# Patient Record
Sex: MALE | Race: WHITE
[De-identification: names, ages, dates, MRNs, and addresses within clinical notes are randomized per-mention and may not be internally consistent; named-entity substitution may affect disease eponyms.]

---

## 2018-01-26 ENCOUNTER — HOSPITAL ENCOUNTER (EMERGENCY)
Dept: HOSPITAL 17 - PHED | Age: 68
Discharge: HOME | End: 2018-01-26
Payer: MEDICARE

## 2018-01-26 VITALS
SYSTOLIC BLOOD PRESSURE: 111 MMHG | DIASTOLIC BLOOD PRESSURE: 61 MMHG | TEMPERATURE: 98.5 F | HEART RATE: 88 BPM | OXYGEN SATURATION: 99 % | RESPIRATION RATE: 18 BRPM

## 2018-01-26 VITALS — BODY MASS INDEX: 26.76 KG/M2 | HEIGHT: 73 IN | WEIGHT: 201.94 LBS

## 2018-01-26 VITALS
RESPIRATION RATE: 18 BRPM | SYSTOLIC BLOOD PRESSURE: 133 MMHG | DIASTOLIC BLOOD PRESSURE: 73 MMHG | HEART RATE: 82 BPM | OXYGEN SATURATION: 98 %

## 2018-01-26 DIAGNOSIS — F17.210: ICD-10-CM

## 2018-01-26 DIAGNOSIS — Z88.5: ICD-10-CM

## 2018-01-26 DIAGNOSIS — E11.9: ICD-10-CM

## 2018-01-26 DIAGNOSIS — R10.30: Primary | ICD-10-CM

## 2018-01-26 DIAGNOSIS — I10: ICD-10-CM

## 2018-01-26 DIAGNOSIS — Z79.84: ICD-10-CM

## 2018-01-26 DIAGNOSIS — R16.0: ICD-10-CM

## 2018-01-26 DIAGNOSIS — Z79.899: ICD-10-CM

## 2018-01-26 DIAGNOSIS — K80.20: ICD-10-CM

## 2018-01-26 LAB
ALBUMIN SERPL-MCNC: 3.4 GM/DL (ref 3.4–5)
ALP SERPL-CCNC: 126 U/L (ref 45–117)
ALT SERPL-CCNC: 68 U/L (ref 12–78)
AST SERPL-CCNC: 57 U/L (ref 15–37)
BASOPHILS # BLD AUTO: 0 TH/MM3 (ref 0–0.2)
BASOPHILS NFR BLD: 0.6 % (ref 0–2)
BILIRUB SERPL-MCNC: 0.8 MG/DL (ref 0.2–1)
BUN SERPL-MCNC: 23 MG/DL (ref 7–18)
CALCIUM SERPL-MCNC: 9.2 MG/DL (ref 8.5–10.1)
CHLORIDE SERPL-SCNC: 105 MEQ/L (ref 98–107)
COLOR UR: YELLOW
CREAT SERPL-MCNC: 1.5 MG/DL (ref 0.6–1.3)
EOSINOPHIL # BLD: 0.2 TH/MM3 (ref 0–0.4)
EOSINOPHIL NFR BLD: 2.9 % (ref 0–4)
ERYTHROCYTE [DISTWIDTH] IN BLOOD BY AUTOMATED COUNT: 14.6 % (ref 11.6–17.2)
GFR SERPLBLD BASED ON 1.73 SQ M-ARVRAT: 47 ML/MIN (ref 89–?)
GLUCOSE SERPL-MCNC: 147 MG/DL (ref 74–106)
GLUCOSE UR STRIP-MCNC: (no result) MG/DL
HCO3 BLD-SCNC: 23.5 MEQ/L (ref 21–32)
HCT VFR BLD CALC: 34 % (ref 39–51)
HGB BLD-MCNC: 11 GM/DL (ref 13–17)
HGB UR QL STRIP: (no result)
INR PPP: 1.1 RATIO
KETONES UR STRIP-MCNC: (no result) MG/DL
LIPASE: 189 U/L (ref 73–393)
LYMPHOCYTES # BLD AUTO: 1.1 TH/MM3 (ref 1–4.8)
LYMPHOCYTES NFR BLD AUTO: 16.9 % (ref 9–44)
MCH RBC QN AUTO: 28.9 PG (ref 27–34)
MCHC RBC AUTO-ENTMCNC: 32.3 % (ref 32–36)
MCV RBC AUTO: 89.5 FL (ref 80–100)
MONOCYTE #: 0.7 TH/MM3 (ref 0–0.9)
MONOCYTES NFR BLD: 10.8 % (ref 0–8)
NEUTROPHILS # BLD AUTO: 4.4 TH/MM3 (ref 1.8–7.7)
NEUTROPHILS NFR BLD AUTO: 68.8 % (ref 16–70)
NITRITE UR QL STRIP: (no result)
PLATELET # BLD: 136 TH/MM3 (ref 150–450)
PMV BLD AUTO: 7.2 FL (ref 7–11)
PROT SERPL-MCNC: 9.2 GM/DL (ref 6.4–8.2)
PROTHROMBIN TIME: 11.3 SEC (ref 9.8–11.6)
RBC # BLD AUTO: 3.8 MIL/MM3 (ref 4.5–5.9)
RBC #/AREA URNS HPF: (no result) /HPF (ref 0–3)
SODIUM SERPL-SCNC: 138 MEQ/L (ref 136–145)
SP GR UR STRIP: 1.01 (ref 1–1.03)
SQUAMOUS #/AREA URNS HPF: (no result) /HPF (ref 0–5)
URINE LEUKOCYTE ESTERASE: (no result)
WBC # BLD AUTO: 6.4 TH/MM3 (ref 4–11)

## 2018-01-26 PROCEDURE — 81001 URINALYSIS AUTO W/SCOPE: CPT

## 2018-01-26 PROCEDURE — 85610 PROTHROMBIN TIME: CPT

## 2018-01-26 PROCEDURE — 85730 THROMBOPLASTIN TIME PARTIAL: CPT

## 2018-01-26 PROCEDURE — 96375 TX/PRO/DX INJ NEW DRUG ADDON: CPT

## 2018-01-26 PROCEDURE — 99284 EMERGENCY DEPT VISIT MOD MDM: CPT

## 2018-01-26 PROCEDURE — 80053 COMPREHEN METABOLIC PANEL: CPT

## 2018-01-26 PROCEDURE — 96374 THER/PROPH/DIAG INJ IV PUSH: CPT

## 2018-01-26 PROCEDURE — 74177 CT ABD & PELVIS W/CONTRAST: CPT

## 2018-01-26 PROCEDURE — 85025 COMPLETE CBC W/AUTO DIFF WBC: CPT

## 2018-01-26 PROCEDURE — 83690 ASSAY OF LIPASE: CPT

## 2018-01-26 NOTE — PD
HPI


Chief Complaint:  Abdominal Pain


Time Seen by Provider:  13:36


Travel History


International Travel<30 days:  No


Contact w/Intl Traveler<30days:  No


Traveled to known affect area:  No





History of Present Illness


HPI


This patient complains of abdominal pain.  Duration is 2 months.  It's a 

constant daily pain.  Location is bilateral lower quadrants.  Symptoms have no 

alleviating factors.  There are no exacerbating factors.  He denies diarrhea or 

vomiting or fever.





PFSH


Past Medical History


Diabetes:  Yes


Patient Takes Glucophage:  Yes


Hypertension:  Yes


Tetanus Vaccination:  > 5 Years


Influenza Vaccination:  No





Social History


Alcohol Use:  No


Tobacco Use:  Yes (1/2 PPD)


Substance Use:  No





Allergies-Medications


(Allergen,Severity, Reaction):  


Coded Allergies:  


     codeine (Verified  Allergy, Severe, VOMITING, 1/26/18)


Reported Meds & Prescriptions





Reported Meds & Active Scripts


Active


Tramadol (Tramadol HCl) 50 Mg Tab 50 Mg PO Q6H PRN


Reported


Amlodipine (Amlodipine Besylate) 2.5 Mg Tab 2.5 Mg PO DAILY


Losartan (Losartan Potassium) 25 Mg Tab 12.5 Mg PO DAILY


Metformin ER (Metformin HCl) 1,000 Mg Jonathan 1,000 Mg PO BID


     With evening meal








Review of Systems


General / Constitutional:  No: Fever


Eyes:  No: Visual changes


HENT:  No: Headaches


Cardiovascular:  No: Chest Pain or Discomfort


Respiratory:  No: Shortness of Breath


Gastrointestinal:  Positive: Abdominal Pain


Genitourinary:  No: Dysuria


Musculoskeletal:  No: Pain


Skin:  No Rash


Neurologic:  No: Weakness


Psychiatric:  No: Depression


Endocrine:  No: Polydipsia


Hematologic/Lymphatic:  No: Easy Bruising





Physical Exam


Narrative


GENERAL: Well-nourished, well-developed patient in no apparent distress.


SKIN: Focused skin assessment reveals no rash and nodules. Skin is Warm and dry.


HEAD: Atraumatic. Normocephalic. 


EYES: Pupils equal and round. No scleral icterus. No injection or drainage. 


ENT: No nasal bleeding or discharge.  Mucous membranes pink and moist.


NECK: Trachea midline. No JVD. 


CARDIOVASCULAR: Regular rate and rhythm.  No murmur appreciated.


RESPIRATORY: No accessory muscle use. Clear to auscultation. Breath sounds 

equal bilaterally. 


GASTROINTESTINAL: Abdomen soft, non-tender, nondistended. Hepatic and splenic 

margins not palpable. 


MUSCULOSKELETAL: No obvious deformities. No clubbing.  No cyanosis.  No edema. 


NEUROLOGICAL: Awake and alert. No obvious cranial nerve deficits.  Motor 

grossly within normal limits. Normal speech.


PSYCHIATRIC: Appropriate mood and affect; insight and judgment normal.





Data


Data


Last Documented VS





Vital Signs








  Date Time  Temp Pulse Resp B/P (MAP) Pulse Ox O2 Delivery O2 Flow Rate FiO2


 


1/26/18 14:10  82 18 133/73 (93) 98 Room Air  


 


1/26/18 11:52 98.5       








Orders





 Orders


Complete Blood Count With Diff (1/26/18 13:43)


Comprehensive Metabolic Panel (1/26/18 13:43)


Lipase (1/26/18 13:43)


Prothrombin Time / Inr (Pt) (1/26/18 13:43)


Act Partial Throm Time (Ptt) (1/26/18 13:43)


Urinalysis - C+S If Indicated (1/26/18 13:43)


Ct Abd/Pel W Iv Contrast(Rout) (1/26/18 13:43)


Iv Access Insert/Monitor (1/26/18 13:43)


NPO (1/26/18 13:43)


Morphine Inj (Morphine Inj) (1/26/18 13:45)


Ondansetron Inj (Zofran Inj) (1/26/18 13:45)


Sodium Chloride 0.9% Flush (Ns Flush) (1/26/18 13:45)


Iohexol 350 Inj (Omnipaque 350 Inj) (1/26/18 14:44)





Labs





Laboratory Tests








Test


  1/26/18


14:00 1/26/18


14:15


 


White Blood Count 6.4 TH/MM3  


 


Red Blood Count 3.80 MIL/MM3  


 


Hemoglobin 11.0 GM/DL  


 


Hematocrit 34.0 %  


 


Mean Corpuscular Volume 89.5 FL  


 


Mean Corpuscular Hemoglobin 28.9 PG  


 


Mean Corpuscular Hemoglobin


Concent 32.3 % 


  


 


 


Red Cell Distribution Width 14.6 %  


 


Platelet Count 136 TH/MM3  


 


Mean Platelet Volume 7.2 FL  


 


Neutrophils (%) (Auto) 68.8 %  


 


Lymphocytes (%) (Auto) 16.9 %  


 


Monocytes (%) (Auto) 10.8 %  


 


Eosinophils (%) (Auto) 2.9 %  


 


Basophils (%) (Auto) 0.6 %  


 


Neutrophils # (Auto) 4.4 TH/MM3  


 


Lymphocytes # (Auto) 1.1 TH/MM3  


 


Monocytes # (Auto) 0.7 TH/MM3  


 


Eosinophils # (Auto) 0.2 TH/MM3  


 


Basophils # (Auto) 0.0 TH/MM3  


 


CBC Comment DIFF FINAL  


 


Differential Comment   


 


Prothrombin Time 11.3 SEC  


 


Prothromb Time International


Ratio 1.1 RATIO 


  


 


 


Activated Partial


Thromboplast Time 25.0 SEC 


  


 


 


Blood Urea Nitrogen 23 MG/DL  


 


Creatinine 1.50 MG/DL  


 


Random Glucose 147 MG/DL  


 


Total Protein 9.2 GM/DL  


 


Albumin 3.4 GM/DL  


 


Calcium Level 9.2 MG/DL  


 


Alkaline Phosphatase 126 U/L  


 


Aspartate Amino Transf


(AST/SGOT) 57 U/L 


  


 


 


Alanine Aminotransferase


(ALT/SGPT) 68 U/L 


  


 


 


Total Bilirubin 0.8 MG/DL  


 


Sodium Level 138 MEQ/L  


 


Potassium Level 3.9 MEQ/L  


 


Chloride Level 105 MEQ/L  


 


Carbon Dioxide Level 23.5 MEQ/L  


 


Anion Gap 10 MEQ/L  


 


Estimat Glomerular Filtration


Rate 47 ML/MIN 


  


 


 


Lipase 189 U/L  


 


Urine Collection Type  CLEAN CATCH 


 


Urine Color  YELLOW 


 


Urine Turbidity  CLEAR 


 


Urine pH  5.5 


 


Urine Specific Gravity  1.015 


 


Urine Protein  NEG mg/dL 


 


Urine Glucose (UA)  NEG mg/dL 


 


Urine Ketones  NEG mg/dL 


 


Urine Occult Blood  NEG 


 


Urine Nitrite  NEG 


 


Urine Bilirubin  NEG 


 


Urine Leukocyte Esterase  NEG 


 


Urine RBC  0-3 /hpf 


 


Urine Squamous Epithelial


Cells 


  0-5 /hpf 


 


 


Microscopic Urinalysis Comment


  


  CULT NOT


INDICATED


 


Urine Collection Time  14:15 











MDM


Medical Decision Making


Medical Screen Exam Complete:  Yes


Emergency Medical Condition:  Yes


Medical Record Reviewed:  Yes


Differential Diagnosis


Aneurysm, colitis, ileus


Narrative Course


I have reviewed the patient's electronic medical record.





IV placed


CBC is normal


metabolic profile is normal


LFT's are normal


lipase is normal


Urinalysis is clean


CT of abdomen and pelvis shows findings of a 4 cm mass in the liver.  

Concerning for tumor.  I reviewed in detail with the patient.  He is to discuss 

with his physician and get follow-up imaging or further diagnostics.  This does 

not need to be done emergently today. 


Gave him a dose of morphine and Zofran for symptom relief





It's not clear that this liver lesion is the cause of his pain.  He has no pain 

in the right upper quadrant.  It could be an incidental finding.


Wrote him some tramadol.


Stable for outpatient follow-up





Diagnosis





 Primary Impression:  


 Chronic bilateral lower abdominal pain


 Additional Impression:  


 Liver mass





***Additional Instructions:  


The patient was advised to follow up with their physician and return if they 

worsen.


The patient was warned about potential sedation for the medications they will 

receive on prescription.


***Med/Other Pt SpecificInfo:  Prescription(s) given


Scripts


Tramadol (Tramadol) 50 Mg Tab


50 MG PO Q6H Y for PAIN, #20 TAB 0 Refills


   Prov: Jose Arita MD         1/26/18


Disposition:  01 DISCHARGE HOME


Condition:  Stable











Jose Arita MD Jan 26, 2018 13:52

## 2018-01-26 NOTE — RADRPT
EXAM DATE/TIME:  01/26/2018 14:37 

 

[facility] COMPARISON:     

No previous studies available for comparison.

 

 

INDICATIONS :     

Lower abdominal pain x 1 month. 

                      

 

IV CONTRAST:     

80 cc Omnipaque 350 (iohexol) IV 

 

 

ORAL CONTRAST:      

No oral contrast ingested.

                      

 

RADIATION DOSE:     

16.59 CTDIvol (mGy) 

 

 

MEDICAL HISTORY :     

Diabetes mellitus type 2. Hypertension. 

 

SURGICAL HISTORY :      

None. 

 

ENCOUNTER:      

Initial

 

ACUITY:      

1 month

 

PAIN SCALE:      

10/10

 

LOCATION:       

Bilateral lower quadrant 

 

TECHNIQUE:     

Volumetric scanning of the abdomen and pelvis was performed.  Using automated exposure control and ad
justment of the mA and/or kV according to patient size, radiation dose was kept as low as reasonably 
achievable to obtain optimal diagnostic quality images.  DICOM format image data is available electro
nically for review and comparison.  

 

FINDINGS:     

 

LOWER LUNGS:     

The visualized lower lungs are clear.

 

LIVER:     

There is an area of abnormal density involving segment 3 of the liver worrisome for a mass. This manda
ures approximately 4 cm in diameter. It does generate a contour irregularity involving the anterior c
apsule. The remaining liver is unremarkable. No ductal dilatation. Portal vein is patent. A solitary 
1 cm calcified gallstone is noted within the gallbladder. The gallbladder is not distended. No gallbl
adder wall thickening.

 

SPLEEN:     

Splenomegaly. The spleen measures 17.3 cm in greatest dimension. No discrete mass. Splenic vein is pa
tent.

 

PANCREAS:     

Within normal limits.

 

KIDNEYS:     

Normal in size and shape.  There is no mass, stone or hydronephrosis. Small cortical renal cysts bila
terally.

 

ADRENAL GLANDS:     

Within normal limits.

 

VASCULAR:     

There is no aortic aneurysm.

 

BOWEL/MESENTERY:     

Varicosities are seen at the GE junction. The stomach, small bowel, and colon demonstrate no acute ab
normality.  There is no free intraperitoneal air or fluid.

 

ABDOMINAL WALL:     

Within normal limits.

 

RETROPERITONEUM:     

There is no lymphadenopathy. 

 

BLADDER:     

No wall thickening or mass. 

 

REPRODUCTIVE:     

Within normal limits.

 

INGUINAL:     

There is no lymphadenopathy or hernia. 

 

MUSCULOSKELETAL:     

Within normal limits for patient age. 

 

CONCLUSION:     

1. Concern for a mass involving segment 3 of the liver. MRI with gadolinium is needed to further eval
uate.

2. Splenomegaly.

3. Cholelithiasis.

4. GE junction varices suggesting portal hypertension.

 

 

 

 Zachary Elias Jr., MD on January 26, 2018 at 14:53           

Board Certified Radiologist.

 This report was verified electronically.

## 2018-03-27 ENCOUNTER — HOSPITAL ENCOUNTER (OUTPATIENT)
Dept: HOSPITAL 17 - HRAD | Age: 68
Discharge: HOME | End: 2018-03-27
Attending: INTERNAL MEDICINE
Payer: MEDICARE

## 2018-03-27 DIAGNOSIS — K76.6: ICD-10-CM

## 2018-03-27 DIAGNOSIS — K74.60: ICD-10-CM

## 2018-03-27 DIAGNOSIS — E11.9: ICD-10-CM

## 2018-03-27 DIAGNOSIS — C22.0: ICD-10-CM

## 2018-03-27 DIAGNOSIS — R18.8: Primary | ICD-10-CM

## 2018-03-27 PROCEDURE — 76705 ECHO EXAM OF ABDOMEN: CPT

## 2018-03-27 NOTE — RADRPT
EXAM DATE/TIME:  03/27/2018 09:57 

 

[facility] COMPARISON:     

No previous studies available for comparison.

        

 

 

INDICATIONS :     

Evaluate for ascites.

                     

 

MEDICAL HISTORY :           

Diabetes. HTN. Liver cancer. Portal hypertension. 

 

SURGICAL HISTORY :          

None.

 

ENCOUNTER:     

Subsequent

 

ACUITY:     

1 day

 

PAIN SCORE:     

5/10

 

LOCATION:         

Four quadrant abdomen.

AREA EVALUATED:       

Four quadrant.

 

FINDINGS:     

Imaging of the abdomen and pelvis was performed to evaluate for ascites for possible paracentesis.  

 

No significant free fluid was identified in the abdomen.

 

CONCLUSION:     

No significant ascites.

 

 

 

 Brendon Bright MD on March 27, 2018 at 10:19           

Board Certified Radiologist.

 This report was verified electronically.

## 2018-04-18 ENCOUNTER — HOSPITAL ENCOUNTER (OUTPATIENT)
Dept: HOSPITAL 17 - HROP | Age: 68
Discharge: HOME | End: 2018-04-18
Attending: INTERNAL MEDICINE
Payer: MEDICARE

## 2018-04-18 VITALS
SYSTOLIC BLOOD PRESSURE: 128 MMHG | DIASTOLIC BLOOD PRESSURE: 56 MMHG | HEART RATE: 83 BPM | RESPIRATION RATE: 17 BRPM | OXYGEN SATURATION: 100 %

## 2018-04-18 DIAGNOSIS — E11.9: ICD-10-CM

## 2018-04-18 DIAGNOSIS — K74.60: ICD-10-CM

## 2018-04-18 DIAGNOSIS — I10: ICD-10-CM

## 2018-04-18 DIAGNOSIS — B19.20: ICD-10-CM

## 2018-04-18 DIAGNOSIS — C22.0: Primary | ICD-10-CM

## 2018-04-19 NOTE — RADRPT
EXAM DATE/TIME:  04/18/2018 00:00 

 

[facility] COMPARISON :  

No previous studies available for comparison.

 

INDICATIONS :      

consult Y-90 embolization

                              

 

OBJECTIVE:      

 

Temperature:     

98.6

 

Heart Rate:     

83

 

Blood Pressure:     

128/56

 

Respiratory:     

17

 

Oximetry:     

100

                              

 

PNEUMONIA VACCINE:            

                              

 

HISTORY OF PRESENT ILLNESS: 

67-year-old male with history of hepatitis C. cirrhosis and recently diagnosed infiltrative hepatocel
lular carcinoma. Patient has moderate lower abdominal pain which may be related to subcapsular lesion
s with significant capsular deformity in segment 2. He is otherwise without complaints. ECOG 1. most 
recent lab results dated 1/26 demonstrate essentially normal LFTs. Most recent PET/CT demonstrates sm
all metastatic foci at L5 and S1. 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

 

PAST MEDICAL HISTORY :  

1.       Hypertension.

2.       Diabetes mellitus 2.

3.       hepatocellular carcinoma

 

PAST SURGICAL HISTORY :

      None.

      

 

SOCIAL HISTORY : 

No alcohol use.

Tobacco;none.

 

 

 

1.     losartan/hctz 100-12.5 mg q.d.

2.     amlodipine 10 mg q.d.

3.     metformin ER 1000 mg b.i.d.

4.     omeprazole DR 20 mg q.d.

5.     oxycodone 10 mg prn

         

  

 

PHYSICAL EXAMINATION: 

 

General: 

No acute distress

 

Abdomen: 

Moderate tenderness on epigastric palpation

 

 

IMAGING STUDIES:  

Her MRI exam dated 2/14/2018 and PET/CT exam dated 4/5/2018 were reviewed. These demonstrate signific
ant infiltrative mass primarily in the left lobe liver with probable portal vein involvement and smal
l focal metastases at L5 and S1. Otherwise, no significant extrahepatic disease.

 

ASSESSMENT:

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 He i
s very motivated to pursue treatment and is obviously not a surgical candidate. Extensive discussion 
regarding locoregional treatment options including chemoembolization and yttrium-90. Given the extent
 of disease and portal vein involvement, yttrium-90 is the best option as it is less embolic and bett
er tolerated.

 

Extensive discussion regarding risks and benefits. All questions were answered.

 

PLAN: 

Ytrium-90 embolization

 

TIME SPENT:

30 minutes

 

 

 

 

 Sam Del Cid MD on April 19, 2018 at 7:55           

Board Certified Radiologist.

 This report was verified electronically.

## 2018-05-17 ENCOUNTER — HOSPITAL ENCOUNTER (OUTPATIENT)
Age: 68
Discharge: HOME | End: 2018-05-17

## 2018-05-17 DIAGNOSIS — Z72.0: ICD-10-CM

## 2018-05-17 DIAGNOSIS — E11.9: ICD-10-CM

## 2018-05-17 DIAGNOSIS — K74.60: ICD-10-CM

## 2018-05-17 DIAGNOSIS — C22.0: Primary | ICD-10-CM

## 2018-05-17 DIAGNOSIS — Z79.84: ICD-10-CM

## 2018-05-17 DIAGNOSIS — I10: ICD-10-CM

## 2018-05-17 DIAGNOSIS — B19.20: ICD-10-CM

## 2018-05-17 PROCEDURE — 77370 RADIATION PHYSICS CONSULT: CPT

## 2018-05-17 PROCEDURE — 37243 VASC EMBOLIZE/OCCLUDE ORGAN: CPT

## 2018-05-17 PROCEDURE — 79445 NUCLEAR RX INTRA-ARTERIAL: CPT

## 2018-05-17 PROCEDURE — 36247 INS CATH ABD/L-EXT ART 3RD: CPT

## 2018-05-17 PROCEDURE — 80053 COMPREHEN METABOLIC PANEL: CPT

## 2018-05-17 PROCEDURE — 78201 LIVER IMAGING STATIC ONLY: CPT

## 2018-05-17 PROCEDURE — 77300 RADIATION THERAPY DOSE PLAN: CPT

## 2018-05-17 PROCEDURE — 75774 ARTERY X-RAY EACH VESSEL: CPT

## 2018-05-17 PROCEDURE — 99153 MOD SED SAME PHYS/QHP EA: CPT

## 2018-05-17 PROCEDURE — 75726 ARTERY X-RAYS ABDOMEN: CPT

## 2018-05-17 PROCEDURE — 85025 COMPLETE CBC W/AUTO DIFF WBC: CPT

## 2018-05-17 PROCEDURE — 99152 MOD SED SAME PHYS/QHP 5/>YRS: CPT

## 2018-05-17 PROCEDURE — 76937 US GUIDE VASCULAR ACCESS: CPT

## 2018-05-24 ENCOUNTER — HOSPITAL ENCOUNTER (OUTPATIENT)
Dept: HOSPITAL 17 - HROP | Age: 68
Discharge: HOME | End: 2018-05-24
Attending: RADIOLOGY
Payer: MEDICARE

## 2018-05-24 DIAGNOSIS — Z48.3: Primary | ICD-10-CM

## 2018-05-24 DIAGNOSIS — C22.0: ICD-10-CM

## 2018-05-25 NOTE — RADRPT
EXAM DATE:  5/24/2018 6:37 PM EDT

AGE/SEX:        67 years / Male



HISTORY OF PRESENT ILLNESS:



Postop day #7 status post right hepatic lobe image 90 immunization. Very uneventful postoperative cou
rse. Patient has stable abdominal pain, presumably secondary to capsular distention in the left lobe.
 He is otherwise without new complaints. Tentative plan for embolization of the left hepatic lobe in 
approximately 3-4 weeks pending intact hepatic function.





TIME SPENT: 15 minutes.





Electronically signed by: Sam Del Cid MD  5/25/2018 7:43 AM EDT

## 2018-06-27 ENCOUNTER — HOSPITAL ENCOUNTER (OUTPATIENT)
Dept: HOSPITAL 17 - HRIP | Age: 68
Discharge: HOME | End: 2018-06-27
Attending: INTERNAL MEDICINE
Payer: MEDICARE

## 2018-06-27 VITALS
HEART RATE: 66 BPM | OXYGEN SATURATION: 98 % | SYSTOLIC BLOOD PRESSURE: 115 MMHG | DIASTOLIC BLOOD PRESSURE: 60 MMHG | RESPIRATION RATE: 18 BRPM

## 2018-06-27 VITALS
TEMPERATURE: 98.3 F | RESPIRATION RATE: 18 BRPM | OXYGEN SATURATION: 99 % | HEART RATE: 62 BPM | DIASTOLIC BLOOD PRESSURE: 58 MMHG | SYSTOLIC BLOOD PRESSURE: 112 MMHG

## 2018-06-27 VITALS
SYSTOLIC BLOOD PRESSURE: 107 MMHG | OXYGEN SATURATION: 100 % | DIASTOLIC BLOOD PRESSURE: 71 MMHG | TEMPERATURE: 98 F | HEART RATE: 94 BPM | RESPIRATION RATE: 18 BRPM

## 2018-06-27 VITALS
RESPIRATION RATE: 18 BRPM | SYSTOLIC BLOOD PRESSURE: 122 MMHG | OXYGEN SATURATION: 96 % | HEART RATE: 72 BPM | DIASTOLIC BLOOD PRESSURE: 61 MMHG

## 2018-06-27 VITALS
HEART RATE: 64 BPM | RESPIRATION RATE: 18 BRPM | OXYGEN SATURATION: 96 % | DIASTOLIC BLOOD PRESSURE: 58 MMHG | SYSTOLIC BLOOD PRESSURE: 117 MMHG

## 2018-06-27 VITALS — BODY MASS INDEX: 21.08 KG/M2 | WEIGHT: 164.24 LBS | HEIGHT: 74 IN

## 2018-06-27 DIAGNOSIS — I10: ICD-10-CM

## 2018-06-27 DIAGNOSIS — E11.9: ICD-10-CM

## 2018-06-27 DIAGNOSIS — B19.20: ICD-10-CM

## 2018-06-27 DIAGNOSIS — C22.0: Primary | ICD-10-CM

## 2018-06-27 DIAGNOSIS — K74.60: ICD-10-CM

## 2018-06-27 LAB
ALBUMIN SERPL-MCNC: 2.6 GM/DL (ref 3.4–5)
ALP SERPL-CCNC: 309 U/L (ref 45–117)
ALT SERPL-CCNC: 59 U/L (ref 12–78)
AST SERPL-CCNC: 101 U/L (ref 15–37)
BILIRUB SERPL-MCNC: 1.8 MG/DL (ref 0.2–1)
BUN SERPL-MCNC: 37 MG/DL (ref 7–18)
CALCIUM SERPL-MCNC: 9.4 MG/DL (ref 8.5–10.1)
CHLORIDE SERPL-SCNC: 108 MEQ/L (ref 98–107)
CREAT SERPL-MCNC: 1.19 MG/DL (ref 0.6–1.3)
GFR SERPLBLD BASED ON 1.73 SQ M-ARVRAT: 61 ML/MIN (ref 89–?)
GLUCOSE SERPL-MCNC: 140 MG/DL (ref 74–106)
HCO3 BLD-SCNC: 16.2 MEQ/L (ref 21–32)
INR PPP: 1.2 RATIO
PROT SERPL-MCNC: 9.4 GM/DL (ref 6.4–8.2)
PROTHROMBIN TIME: 11.8 SEC (ref 9.8–11.6)
SODIUM SERPL-SCNC: 137 MEQ/L (ref 136–145)

## 2018-06-27 PROCEDURE — C1887 CATHETER, GUIDING: HCPCS

## 2018-06-27 PROCEDURE — 77300 RADIATION THERAPY DOSE PLAN: CPT

## 2018-06-27 PROCEDURE — 76937 US GUIDE VASCULAR ACCESS: CPT

## 2018-06-27 PROCEDURE — 80053 COMPREHEN METABOLIC PANEL: CPT

## 2018-06-27 PROCEDURE — C1769 GUIDE WIRE: HCPCS

## 2018-06-27 PROCEDURE — 78201 LIVER IMAGING STATIC ONLY: CPT

## 2018-06-27 PROCEDURE — 99153 MOD SED SAME PHYS/QHP EA: CPT

## 2018-06-27 PROCEDURE — C9113 INJ PANTOPRAZOLE SODIUM, VIA: HCPCS

## 2018-06-27 PROCEDURE — 36247 INS CATH ABD/L-EXT ART 3RD: CPT

## 2018-06-27 PROCEDURE — C2616 BRACHYTX, NON-STR,YTTRIUM-90: HCPCS

## 2018-06-27 PROCEDURE — 75774 ARTERY X-RAY EACH VESSEL: CPT

## 2018-06-27 PROCEDURE — 75726 ARTERY X-RAYS ABDOMEN: CPT

## 2018-06-27 PROCEDURE — 37243 VASC EMBOLIZE/OCCLUDE ORGAN: CPT

## 2018-06-27 PROCEDURE — C1894 INTRO/SHEATH, NON-LASER: HCPCS

## 2018-06-27 PROCEDURE — 85610 PROTHROMBIN TIME: CPT

## 2018-06-27 PROCEDURE — 99152 MOD SED SAME PHYS/QHP 5/>YRS: CPT

## 2018-06-27 PROCEDURE — 75898 FOLLOW-UP ANGIOGRAPHY: CPT

## 2018-06-27 NOTE — PD.RAD
Post Procedure Progress Note


Pre Procedure Diagnosis:  


(1) HCC (hepatocellular carcinoma)


Post Procedure Diagnosis:  


(1) HCC (hepatocellular carcinoma)


Procedure Date:


Jun 27, 2018


Supervising Radiologist:


Sam Del Cid


Anesthesia:  Conscious Sedation


Plan of Activity


Patient to Unit:  [facility]


Patient Condition:  Good


See PACS Report for procedural detail/treatment











Sam Del Cid MD Jun 27, 2018 14:52

## 2018-06-27 NOTE — RADRPT
EXAM DATE:  6/27/2018 2:40 PM EDT

AGE/SEX:        67 years / Male



INDICATIONS:  Patient with history of hepatocellular carcinoma in need of Y-90 embolization.



CLINICAL DATA:  This is the patient's subsequent encounter. Patient reports that signs and symptoms h
ave been present for 7 - 11 months and indicates a pain score of 0/10. 

                                                                          

MEDICAL/SURGICAL HISTORY:       Hepatitis C.  Diabetes.  Hypertension.  Cirrhosis.Esophageal varices.
Hepatic mass. Colonoscopy.EGD.Y-90 embolization.Paracentesis



COMPARISON:      No prior exams available for comparison. 





FLUORO TIME (min):     8.1

IMAGE SERIES:               5

ACCESS SITE:                  Left radial artery

SEDATION TIME (min):  90



CONTRAST (cc): 35cc  Visipaque (iodixanol)  



MEDICATION(S):

5mg midazolam (Versed) IV

200mcg fentanyl (Sublimaze) IV









Prophylactic antibiotics were administered with appropriate pre-procedure timing. Vancomycin within 2
 hrs of procedure, Ancef (or alternative) within 1 hr of procedure.



DEVICE(S):

Left hepatic artery [id number]

Left radial artery [id number] Radial compression device 















 

. .



PROCEDURE :  

1.  Ultrasound-guided puncture of the left radial artery

2.  Conscious sedation with continuous EKG and Oximetry monitoring.

3.  Selective catheter placement in the celiac artery with selective angiography

4.  Subselective catheter placement in the left hepatic artery arising from the left gastric artery

5.  Y90 embolization of the left hepatic artery



The risks, benefits and alternatives to the procedure were explained and verbal and written consent w
as obtained.  The site was prepped in sterile fashion.  Full sterile technique was used, including ca
p, mask, sterile gloves and gown and a large sterile sheet.  Hand hygiene and 2% chlorhexidine and/or
 betadine/alcohol prep was utilized per protocol for cutaneous antisepsis.  Sterile gel and sterile p
robe cover were utilized for ultrasound guidance.  The skin and subcutaneous tissues were infiltrated
 with local anesthetic solution.  



With ultrasound and fluoroscopic guidance the selected artery was punctured and a vascular sheath was
 placed. Cyndie 4 catheter was advanced into the celiac artery and angiography was performed. This aga
in confirms diffuse abnormal enhancement in the right and left lobes of the liver with aberrant origi
n of the left hepatic artery from the left gastric artery. Renegade microcatheter and Fathom 16 wire 
were then advanced into the left hepatic artery and angiography was performed confirming position and
 the findings.



The SIR-Spheres acrylic delivery box (V-vial) and delivery set were prepped in standard fashion. The 
labels on the tubing and needle was corresponded to the 's designations. The dose of SIR-
Spheres to be delivered was determined by taking into account the patient's body surface area, tumor 
burden, liver function test, previous therapy, performance status, and lung shunting.



Next, multiple small aliquots of SIR-Spheres were delivered intra-arterially to the hepatic masses wi
 close fluoroscopic monitoring to ensure forward flow in the vessel during the administration of th
e Y-90 microspheres. Follow-up angiogram demonstrated persistent forward flow.



Next, the microcatheter was withdrawn into the angiographic catheter, which was then removed from the
 sheath, and catheter tip grasp with multiple layers of sterile gauze. The catheters, the fall and ot
her tubing and needle was associated with the delivery set were placed into a container provided by [employer].



Sheath was then removed and hemostasis obtained at the puncture site with TR band device. The patient
 tolerated the procedure well and there were no complications.



Conscious sedation was performed with the prescribed dosages and duration as above in the presence of
 an independent trained radiology nurse to assist in the monitoring of the patient.  EKG and oximetry
 remained stable throughout the procedure.





CONCLUSION: 

1.  Uncomplicated Y-90 embolization of the left hepatic arteries, as above.



Electronically signed by: Sam Del Cid MD  6/27/2018 4:00 PM EDT

## 2018-06-27 NOTE — RADRPT
EXAM DATE:  6/27/2018 3:37 PM EDT

AGE/SEX:        67 years / Male



INDICATIONS:  Liver cancer.



CLINICAL DATA:  This is the patient's subsequent encounter. Patient reports that signs and symptoms h
ave been present for > 1 year and indicates a pain score of 0/10. 

                                                                          

MEDICAL/SURGICAL HISTORY:       Hypertension.  Diabetes mellitus type II. None.



COMPARISON:      [facility], [facility] Y-90 SCAN, 5/17/2018.  . 





TECHNIQUE: Imaging was performed after injection of radioactive tracer.



DOSE:  29.7 mCi Yttrium-90 Intraarterial











FINDINGS:  

Patient is status post intramedullary mineralization of the left hepatic lobe. Activity is confined t
o the left hepatic lobe. No significant extra hepatic activity is noted. 



CONCLUSION: 

1.  Y90 embolization of the left hepatic lobe. Stents of abnormal activity throughout the left lobe c
onsistent with infiltrative HCC. No extrahepatic activity.



Electronically signed by: Sam Del Cid MD  6/27/2018 4:33 PM EDT